# Patient Record
Sex: FEMALE | Race: WHITE
[De-identification: names, ages, dates, MRNs, and addresses within clinical notes are randomized per-mention and may not be internally consistent; named-entity substitution may affect disease eponyms.]

---

## 2019-02-20 ENCOUNTER — HOSPITAL ENCOUNTER (INPATIENT)
Dept: HOSPITAL 95 - ER | Age: 59
LOS: 1 days | DRG: 84 | End: 2019-02-21
Attending: HOSPITALIST | Admitting: HOSPITALIST
Payer: COMMERCIAL

## 2019-02-20 VITALS — HEIGHT: 55 IN | WEIGHT: 159.99 LBS

## 2019-02-20 DIAGNOSIS — F17.210: ICD-10-CM

## 2019-02-20 DIAGNOSIS — Z79.01: ICD-10-CM

## 2019-02-20 DIAGNOSIS — Z86.711: ICD-10-CM

## 2019-02-20 DIAGNOSIS — R40.2432: ICD-10-CM

## 2019-02-20 DIAGNOSIS — E03.9: ICD-10-CM

## 2019-02-20 DIAGNOSIS — I10: ICD-10-CM

## 2019-02-20 DIAGNOSIS — S06.369A: Primary | ICD-10-CM

## 2019-02-20 DIAGNOSIS — Z51.5: ICD-10-CM

## 2019-02-20 DIAGNOSIS — Z86.718: ICD-10-CM

## 2019-02-20 DIAGNOSIS — Z91.81: ICD-10-CM

## 2019-02-20 DIAGNOSIS — J44.9: ICD-10-CM

## 2019-02-20 DIAGNOSIS — K21.9: ICD-10-CM

## 2019-02-20 DIAGNOSIS — F41.9: ICD-10-CM
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
## 2019-02-20 LAB
ALBUMIN SERPL BCP-MCNC: 3.9 G/DL (ref 3.4–5)
ALBUMIN/GLOB SERPL: 1 {RATIO} (ref 0.8–1.8)
ALT SERPL W P-5'-P-CCNC: 47 U/L (ref 12–78)
ANION GAP SERPL CALCULATED.4IONS-SCNC: 17 MMOL/L (ref 6–16)
AST SERPL W P-5'-P-CCNC: 131 U/L (ref 12–37)
BASOPHILS # BLD AUTO: 0.12 K/MM3 (ref 0–0.23)
BASOPHILS NFR BLD AUTO: 1 % (ref 0–2)
BILIRUB SERPL-MCNC: 0.9 MG/DL (ref 0.1–1)
BUN SERPL-MCNC: 15 MG/DL (ref 8–24)
CA-I BLD-SCNC: 0.94 MMOL/L (ref 1.1–1.46)
CALCIUM SERPL-MCNC: 8.9 MG/DL (ref 8.5–10.1)
CHLORIDE BLD-SCNC: 102 MMOL/L (ref 98–108)
CHLORIDE SERPL-SCNC: 100 MMOL/L (ref 98–108)
CO2 BLD-SCNC: 18 MMOL/L (ref 21–32)
CO2 SERPL-SCNC: 18 MMOL/L (ref 21–32)
CREAT BLD-MCNC: 0.6 MG/DL (ref 0.6–1)
CREAT SERPL-MCNC: 0.58 MG/DL (ref 0.4–1)
DEPRECATED RDW RBC AUTO: 53.4 FL (ref 35.1–46.3)
EOSINOPHIL # BLD AUTO: 0.02 K/MM3 (ref 0–0.68)
EOSINOPHIL NFR BLD AUTO: 0 % (ref 0–6)
ERYTHROCYTE [DISTWIDTH] IN BLOOD BY AUTOMATED COUNT: 13.8 % (ref 11.7–14.2)
GLOBULIN SER CALC-MCNC: 4.1 G/DL (ref 2.2–4)
GLUCOSE BLDC GLUCOMTR-MCNC: 144 MG/DL (ref 70–99)
GLUCOSE SERPL-MCNC: 144 MG/DL (ref 70–99)
HCT VFR BLD AUTO: 36.6 % (ref 33–51)
HCT VFR BLD CALC: 42 % (ref 36–46)
HGB BLD CALC-MCNC: 14.3 G/DL (ref 12–16)
HGB BLD-MCNC: 12.6 G/DL (ref 11.5–16)
IMM GRANULOCYTES # BLD AUTO: 0.13 K/MM3 (ref 0–0.1)
IMM GRANULOCYTES NFR BLD AUTO: 1 % (ref 0–1)
LYMPHOCYTES # BLD AUTO: 1.72 K/MM3 (ref 0.84–5.2)
LYMPHOCYTES NFR BLD AUTO: 12 % (ref 21–46)
MCHC RBC AUTO-ENTMCNC: 34.4 G/DL (ref 31.5–36.5)
MCV RBC AUTO: 105 FL (ref 80–100)
MONOCYTES # BLD AUTO: 0.85 K/MM3 (ref 0.16–1.47)
MONOCYTES NFR BLD AUTO: 6 % (ref 4–13)
NEUTROPHILS # BLD AUTO: 11.92 K/MM3 (ref 1.96–9.15)
NEUTROPHILS NFR BLD AUTO: 81 % (ref 41–73)
NRBC # BLD AUTO: 0 K/MM3 (ref 0–0.02)
NRBC BLD AUTO-RTO: 0 /100 WBC (ref 0–0.2)
PH BLDA: 7.37 [PH] (ref 7.35–7.45)
PLATELET # BLD AUTO: 289 K/MM3 (ref 150–400)
POTASSIUM BLD-SCNC: 3.4 MMOL/L (ref 3.5–5.5)
POTASSIUM SERPL-SCNC: 3.4 MMOL/L (ref 3.5–5.5)
PROT SERPL-MCNC: 8 G/DL (ref 6.4–8.2)
PROTHROMBIN TIME: 17.1 SEC (ref 9.7–11.5)
SODIUM BLD-SCNC: 136 MMOL/L (ref 135–148)
SODIUM SERPL-SCNC: 135 MMOL/L (ref 136–145)

## 2019-02-20 PROCEDURE — 0BH17EZ INSERTION OF ENDOTRACHEAL AIRWAY INTO TRACHEA, VIA NATURAL OR ARTIFICIAL OPENING: ICD-10-PCS | Performed by: EMERGENCY MEDICINE

## 2019-02-20 PROCEDURE — 5A1935Z RESPIRATORY VENTILATION, LESS THAN 24 CONSECUTIVE HOURS: ICD-10-PCS | Performed by: EMERGENCY MEDICINE

## 2019-02-20 PROCEDURE — C9132 KCENTRA, PER I.U.: HCPCS

## 2019-02-20 PROCEDURE — 30283B1 TRANSFUSION OF NONAUTOLOGOUS 4-FACTOR PROTHROMBIN COMPLEX CONCENTRATE INTO VEIN, PERCUTANEOUS APPROACH: ICD-10-PCS | Performed by: EMERGENCY MEDICINE

## 2019-02-20 PROCEDURE — P9059 PLASMA, FRZ BETWEEN 8-24HOUR: HCPCS

## 2019-02-20 NOTE — NUR
PT APPEARS COMFORTABLE.  AND FAMILY AT BEDSIDE. EDUCATED ABOUT MEDS AND
IF SHE APPEARS TO BE IN DISTRESS TO NOTIFY RN. WARM BLANKET PROVIDED FOR PT.
COMFORT CARE CART AT DOOR FOR FAMILY WITH REFRESHMENTS.

## 2019-02-20 NOTE — NUR
Present at bedside with family before and after extubation.  Prayer for a
peaceful transition provided to good effect.  Family tearful and appropriate.
They verbalize acceptance of POC.  Youngest dtr very emotional.  She responded
well to gentle .  I will remain available.

## 2019-02-20 NOTE — NUR
RECEIVED TELEPHONE REPORT FROM JAMEL SOLORIO, IN ER, PATIENT ARRIVED FROM ER TO
ROOM 14 VIA STRETCHER, AND ON MECHANICAL VENTILATION, SETTINGS 14/5/400/FiO2
30 %, PATIENT MOVED TO ICU BED VIA SLIDER, PLACED ON MONITOR, SECOND UNIT OF
FFPs STARTED, PATIENT ON PROPOFOL AT 20 MCG, PATIENT SHOWS NO RESPONSE TO
PAINFUL STIMULI, LEFT PUPIL 5 CM AND NONREACTIVE, RIGHT PUPIL 2 CM AND
NONREACTIVE, PATIENT OVERALL FLACCID, WAGONER CATHETER IN PLACE, PIV'S ON LEFT
AND RIGHT FOREARMS, BRUISES NOTED OVER ENTIRUE RIGHT UPPER ARM AND ON RIGHT
LOWER EXTREMITY, DR. JURADO ATTENDING HOSPITALIST, DR. HOFFMAN CONSULTED,
PALLIATIVE CARE CONSULTED, DR. HOFFMAN AND CONI LYNN, PALLIATIVE CARE AT
BEDSIDE TO SPEAK WITH FAMILY,  DECIDED COMFORT CARE AND STATED THAT
PATIENT "DID NOT WANT TO BE AN ORGAN DONOR", COMFORT CARE ORDERS PLACED BY
CONI LYNN, PALLIATIVE CARE AS PER DR. HOFFMAN, PATIENT TO BE EXTUBATED.

## 2019-02-20 NOTE — NUR
Initial Visit:
 
Palliative Care Consult for end of life and comfort care.
 
Pt resting in bed and is unresponsive. Pt appears comfortable. Dr Escobar
present during visit. Pt's  Juan and sister present during visit. Dr Escobar discussed Pt's prognosis. Family V/U of prognosis and makes a decision
to withdraw treatment including extubation and comfort care. Educated family
on comfort care philosophy. Family tearful throughout visit and offered
emotional support. Family wishes to wait for extubation until rest of family
arrives.
 
Placed comfort care order, comfort care order set, and discontinued
maintenance medications per V/O from Dr Escobar.
 
Spoke with Pt's nurse and she is agreeable to plan.
 
 Sarah Beth also present during visit and will remain for additional
spiritual and emotional support.
 
Plan is to monitor for symptom management.

## 2019-02-20 NOTE — NUR
SHIFT SUMMARY NOTE:  PATIENT ARRIVED IN UNIT FROM ED AT 1330 HOURS VIA
STRETCHER, PATIENT WAS INTUBATED WITH SETTINGS: 14/5/400/FiO2 30 %, PATIENT
WAS ON PROPOFOL AT 20 MCG, UNRESPONSIVE, NO REACTION TO PAINFUL STIMULI,
PUPILS UNEQUAL AND NONREACTIVE, DR. HOFFMAN CONSULTED BY HOSPITALIST DR. JURADO,
PROPOFOL WAS TURNED OFF, PATIENT NOT RESTRAINT, GENERALIZED FLACCIDNESS,
DECISION WAS MADE TO MAKE PATIENT DNR/DNI, PLACED ON COMFORT CARE AND
EXTUBATED AT 1625, FAMILY AT BEDSIDE AFTER EXTUBATION, PALLIATIVE CARE IN TO
SEE PATIENT AND SPEAK WITH FAMILY, COMFORT CARE ORDERS PLACED, PATIENT
CONTINUES TO BREATH ON OWN, HR IN 50s AND NSR, WAGONER CATHETER IN PLACE,
PATIENT SHOWS BRUISES ON RIGHT UPPER EXTREMITY AND RIGHT LOWER EXTREMITY FROM
FALL ONE WEEK AGO, PATIENT ALSO ON COUMADIN, CALL LIGHT IN REACH, WILL
CONTINUE TO MONITOR AND GIVE REPORT TO ONCOMING NIGHT SHIFT.

## 2019-02-21 NOTE — NUR
PT PASSED AWAY PEACEFULLY WITH RN AT BEDSIDE. CALLED  AND LEFT MESSAGE
TO CALL HOSPITAL. CALLED DR. CALLOWAY AND NOTIFIED OF DEATH.